# Patient Record
Sex: MALE | Race: WHITE | Employment: FULL TIME | URBAN - METROPOLITAN AREA
[De-identification: names, ages, dates, MRNs, and addresses within clinical notes are randomized per-mention and may not be internally consistent; named-entity substitution may affect disease eponyms.]

---

## 2019-12-26 ENCOUNTER — HOSPITAL ENCOUNTER (EMERGENCY)
Age: 44
Discharge: HOME | End: 2019-12-26
Attending: EMERGENCY MEDICINE
Payer: COMMERCIAL

## 2019-12-26 ENCOUNTER — APPOINTMENT (OUTPATIENT)
Dept: GENERAL RADIOLOGY | Age: 44
End: 2019-12-26
Attending: NURSE PRACTITIONER
Payer: COMMERCIAL

## 2019-12-26 VITALS
TEMPERATURE: 98.3 F | DIASTOLIC BLOOD PRESSURE: 82 MMHG | RESPIRATION RATE: 11 BRPM | BODY MASS INDEX: 26.51 KG/M2 | OXYGEN SATURATION: 97 % | WEIGHT: 200 LBS | HEART RATE: 70 BPM | SYSTOLIC BLOOD PRESSURE: 151 MMHG | HEIGHT: 73 IN

## 2019-12-26 DIAGNOSIS — S62.637B OPEN DISPLACED FRACTURE OF DISTAL PHALANX OF LEFT LITTLE FINGER, INITIAL ENCOUNTER: Primary | ICD-10-CM

## 2019-12-26 LAB
GLUCOSE BLD STRIP.AUTO-MCNC: 107 MG/DL (ref 65–100)
SERVICE CMNT-IMP: ABNORMAL

## 2019-12-26 PROCEDURE — 96365 THER/PROPH/DIAG IV INF INIT: CPT

## 2019-12-26 PROCEDURE — 74011000258 HC RX REV CODE- 258: Performed by: EMERGENCY MEDICINE

## 2019-12-26 PROCEDURE — 75810000293 HC SIMP/SUPERF WND  RPR

## 2019-12-26 PROCEDURE — 73140 X-RAY EXAM OF FINGER(S): CPT

## 2019-12-26 PROCEDURE — 74011250636 HC RX REV CODE- 250/636: Performed by: EMERGENCY MEDICINE

## 2019-12-26 PROCEDURE — 82962 GLUCOSE BLOOD TEST: CPT

## 2019-12-26 PROCEDURE — 93005 ELECTROCARDIOGRAM TRACING: CPT

## 2019-12-26 PROCEDURE — 99285 EMERGENCY DEPT VISIT HI MDM: CPT

## 2019-12-26 PROCEDURE — 74011000250 HC RX REV CODE- 250: Performed by: NURSE PRACTITIONER

## 2019-12-26 RX ORDER — HYDROCODONE BITARTRATE AND ACETAMINOPHEN 5; 325 MG/1; MG/1
1 TABLET ORAL
Qty: 10 TAB | Refills: 0 | Status: SHIPPED | OUTPATIENT
Start: 2019-12-26 | End: 2019-12-29

## 2019-12-26 RX ORDER — AMOXICILLIN AND CLAVULANATE POTASSIUM 875; 125 MG/1; MG/1
1 TABLET, FILM COATED ORAL 2 TIMES DAILY
Qty: 14 TAB | Refills: 0 | Status: SHIPPED | OUTPATIENT
Start: 2019-12-26 | End: 2020-01-02

## 2019-12-26 RX ORDER — LIDOCAINE HYDROCHLORIDE 10 MG/ML
5 INJECTION, SOLUTION EPIDURAL; INFILTRATION; INTRACAUDAL; PERINEURAL ONCE
Status: COMPLETED | OUTPATIENT
Start: 2019-12-26 | End: 2019-12-26

## 2019-12-26 RX ADMIN — LIDOCAINE HYDROCHLORIDE 5 ML: 10 INJECTION INFILTRATION at 15:35

## 2019-12-26 RX ADMIN — AMPICILLIN SODIUM AND SULBACTAM SODIUM 3 G: 2; 1 INJECTION, POWDER, FOR SOLUTION INTRAMUSCULAR; INTRAVENOUS at 17:57

## 2019-12-26 NOTE — ED NOTES
History of Present Illness     Patient Identification  Gary Baron is a 38 y.o. male. Patient information was obtained from patient. History/Exam limitations: none. Patient presented to the Emergency Department by private vehicle. Chief Complaint   Finger Pain and Laceration      Patient is here for evaluation after a fall. Patient reportedly was sitting on edge of bed and slid off when he fell from sitting. The accident occurred 1600  1 hour ago ago. It is reported that the patient did not have LOC. At this time he complains of no injuries. Patient denies no injuries. Symptoms are exacerbated by Nothing. Pre-hospital information: none available. The patient has tried none needed for his symptoms with not here for episode had laceration relief. No past medical history on file. No family history on file.   Current Facility-Administered Medications   Medication Dose Route Frequency Provider Last Rate Last Dose    lidocaine (PF) (XYLOCAINE) 10 mg/mL (1 %) injection 5 mL  5 mL IntraDERMal ONCE Leah Crawford, SARAVANAN        ampicillin-sulbactam (UNASYN) 3 g in 0.9% sodium chloride (MBP/ADV) 100 mL  3 g IntraVENous Young Patel MD         No Known Allergies  Social History     Socioeconomic History    Marital status:      Spouse name: Not on file    Number of children: Not on file    Years of education: Not on file    Highest education level: Not on file   Occupational History    Not on file   Social Needs    Financial resource strain: Not on file    Food insecurity:     Worry: Not on file     Inability: Not on file    Transportation needs:     Medical: Not on file     Non-medical: Not on file   Tobacco Use    Smoking status: Not on file   Substance and Sexual Activity    Alcohol use: Not on file    Drug use: Not on file    Sexual activity: Not on file   Lifestyle    Physical activity:     Days per week: Not on file     Minutes per session: Not on file    Stress: Not on file   Relationships    Social connections:     Talks on phone: Not on file     Gets together: Not on file     Attends Worship service: Not on file     Active member of club or organization: Not on file     Attends meetings of clubs or organizations: Not on file     Relationship status: Not on file    Intimate partner violence:     Fear of current or ex partner: Not on file     Emotionally abused: Not on file     Physically abused: Not on file     Forced sexual activity: Not on file   Other Topics Concern    Not on file   Social History Narrative    Not on file     Review of Systems  A comprehensive review of systems was negative except for that written in the HPI. Physical Exam     Visit Vitals  BP (!) 116/94   Pulse (!) 58   Temp 98.3 °F (36.8 °C)   Resp 14   Ht 6' 1\" (1.854 m)   Wt 90.7 kg (200 lb)   SpO2 97%   BMI 26.39 kg/m²     Burwell Coma Score  Eye openin - Opens eyes on own   Verbal:  5 - Alert and oriented   Motor:  6 - Follows simple motor commands   GCS Total: 15     Visit Vitals  BP (!) 116/94   Pulse (!) 58   Temp 98.3 °F (36.8 °C)   Resp 14   Ht 6' 1\" (1.854 m)   Wt 90.7 kg (200 lb)   SpO2 97%   BMI 26.39 kg/m²     General:  Alert, cooperative, no distress, appears stated age. Head:  Normocephalic, without obvious abnormality, atraumatic. Eyes:  Conjunctivae/corneas clear. PERRL, EOMs intact. Fundi benign   Ears:  Normal TMs and external ear canals both ears. Nose: Nares normal. Septum midline. Mucosa normal. No drainage or sinus tenderness. Throat: Lips, mucosa, and tongue normal. Teeth and gums normal.   Neck: Supple, symmetrical, trachea midline, no adenopathy, thyroid: no enlargement/tenderness/nodules, no carotid bruit and no JVD. Back:   Symmetric, no curvature. ROM normal. No CVA tenderness. Lungs:   Clear to auscultation bilaterally. Chest wall:  No tenderness or deformity. Heart:  Regular rate and rhythm, S1, S2 normal, no murmur, click, rub or gallop. Abdomen:   Soft, non-tender. Bowel sounds normal. No masses,  No organomegaly. Genitalia:  Normal male without lesion, discharge or tenderness. Rectal:  Normal tone, normal prostate, no masses or tenderness  Guaiac negative stool. Extremities: Extremities normal, atraumatic, no cyanosis or edema. Pulses: 2+ and symmetric all extremities. Skin: Skin color, texture, turgor normal. No rashes or lesions   Lymph nodes: Cervical, supraclavicular, and axillary nodes normal.   Neurologic: CNII-XII intact. Normal strength, sensation and reflexes throughout. ED Course     Studies: EKG    Records Reviewed: Previous electrocardiograms. Treatments: None.     Consultations: none at this time    Disposition: Home Advised to return for worsening or additional problems such as abdominal or chest pain

## 2019-12-26 NOTE — ED TRIAGE NOTES
Pt arrives with the c/c of hitting 5th digit with a sledge hammer, laceration to finger, open injury. Pt unsure of tetanus vaccine.

## 2019-12-26 NOTE — ED PROVIDER NOTES
38 y.o. male with no significant past medical history who presents from private vehicle with chief complaint of laceration. Pt reports hitting his left fifth finger with a sledgehammer approximately 1 hour ago. Pt with a laceration to the left fifth finger. Pt is right hand dominant. Pt had a syncopal episode in the ED when he looked at his finger; he was assisted to the ground. Pt states he has a history of passing out in the presence of \"doctors or needles\". NKDA. Pt denies any other injury. There are no other acute medical concerns at this time. Note written by Yamini Moser, as dictated by Blue Lawson MD 3:45 PM        The history is provided by the patient and a relative. No  was used. No past medical history on file. No past surgical history on file. No family history on file.     Social History     Socioeconomic History    Marital status: Not on file     Spouse name: Not on file    Number of children: Not on file    Years of education: Not on file    Highest education level: Not on file   Occupational History    Not on file   Social Needs    Financial resource strain: Not on file    Food insecurity:     Worry: Not on file     Inability: Not on file    Transportation needs:     Medical: Not on file     Non-medical: Not on file   Tobacco Use    Smoking status: Not on file   Substance and Sexual Activity    Alcohol use: Not on file    Drug use: Not on file    Sexual activity: Not on file   Lifestyle    Physical activity:     Days per week: Not on file     Minutes per session: Not on file    Stress: Not on file   Relationships    Social connections:     Talks on phone: Not on file     Gets together: Not on file     Attends Christian service: Not on file     Active member of club or organization: Not on file     Attends meetings of clubs or organizations: Not on file     Relationship status: Not on file    Intimate partner violence:     Fear of current or ex partner: Not on file     Emotionally abused: Not on file     Physically abused: Not on file     Forced sexual activity: Not on file   Other Topics Concern    Not on file   Social History Narrative    Not on file         ALLERGIES: Patient has no known allergies. Review of Systems   Constitutional: Negative for chills and fever. HENT: Negative for ear pain and sore throat. Eyes: Negative for pain. Respiratory: Negative for chest tightness and shortness of breath. Cardiovascular: Negative for chest pain. Gastrointestinal: Negative for abdominal pain. Genitourinary: Negative for flank pain. Musculoskeletal: Negative for back pain. Skin: Positive for wound. Negative for rash. Neurological: Positive for syncope. Negative for headaches. All other systems reviewed and are negative. Vitals:    12/26/19 1533   BP: 129/88   Pulse: 64   Resp: 18   Temp: 98.3 °F (36.8 °C)   SpO2: 99%   Weight: 90.7 kg (200 lb)   Height: 6' 1\" (1.854 m)            Physical Exam  Vitals signs and nursing note reviewed. Constitutional:       General: He is not in acute distress. HENT:      Head: Normocephalic and atraumatic. Eyes:      General: No scleral icterus. Conjunctiva/sclera: Conjunctivae normal.      Pupils: Pupils are equal, round, and reactive to light. Neck:      Musculoskeletal: No neck rigidity. Trachea: No tracheal deviation. Cardiovascular:      Rate and Rhythm: Normal rate and regular rhythm. Pulmonary:      Effort: Pulmonary effort is normal. No respiratory distress. Breath sounds: Normal breath sounds. No stridor. Abdominal:      General: There is no distension. Palpations: Abdomen is soft. Tenderness: There is no tenderness. Genitourinary:     Comments: deferred  Musculoskeletal:         General: No deformity. Left hand: He exhibits laceration. Comments: Transverse laceration of the left 5th digit overlying the distal phalanx. Skin:     General: Skin is warm and dry. Neurological:      General: No focal deficit present. Mental Status: He is alert. Psychiatric:         Mood and Affect: Mood normal.         Behavior: Behavior normal.     Note written by Yamini Klein, as dictated by Emmanuel Forman MD 3:45 PM       MDM  Number of Diagnoses or Management Options  Open displaced fracture of distal phalanx of left little finger, initial encounter:   Diagnosis management comments: 72-year-old right-hand-dominant male with left finger injury after smashing with a sledgehammer. Soaked in Betadine. Irrigated with copious saline. Closed loosely. Given Unasyn IV. Discharged on Augmentin. Pain control. Follow-up closely with Ortho hand.          Wound Repair  Date/Time: 12/26/2019 7:33 PM  Performed by: attendingPreparation: skin prepped with alcohol and skin prepped with Betadine  Location details: left small finger  Wound length:2.6 - 7.5 cm  Anesthesia: digital block    Anesthesia:  Local Anesthetic: lidocaine 1% without epinephrine  Foreign bodies: no foreign bodies  Irrigation solution: saline  Irrigation method: jet lavage  Skin closure: 4-0 nylon  Approximation: loose  Dressing: non-adhesive packing strip and tube gauze  Patient tolerance: Patient tolerated the procedure well with no immediate complications

## 2019-12-26 NOTE — ED NOTES
Patient had a syncopal episode and assisted to floor by Dr. Galen Tee and radiology tech, leatha martinez called. Patient assisted back to bed. Dr. Galen Tee still at bedside    Patient bradycardic with HR decreasing to 32. Dr. Mak Leong made aware. EKG done and peripheral IV access obtained. Patient HR increased to WNL     XRAY at bedside.

## 2019-12-26 NOTE — DISCHARGE INSTRUCTIONS
Patient Education        Finger Fracture: Care Instructions  Your Care Instructions    Breaks in the bones of the finger usually heal well in about 3 to 4 weeks. The pain and swelling from a broken finger can last for weeks. But it should steadily improve, starting a few days after you break it. It is very important that you wear and take care of the cast or splint exactly as your doctor tells you to so that your finger heals properly and does not end up crooked. Wearing a splint may interfere with your normal activities. Ask for help with daily tasks if you need it. You heal best when you take good care of yourself. Eat a variety of healthy foods, and don't smoke. Follow-up care is a key part of your treatment and safety. Be sure to make and go to all appointments, and call your doctor if you are having problems. It's also a good idea to know your test results and keep a list of the medicines you take. How can you care for yourself at home? · If your doctor put a splint on your finger, wear the splint exactly as directed. Do not remove it until your doctor says that you can. · Keep your hand raised above the level of your heart as much as you can. This will help reduce swelling. · Put ice or a cold pack on your finger for 10 to 20 minutes at a time. Try to do this every 1 to 2 hours for the next 3 days (when you are awake) or until the swelling goes down. Put a thin cloth between the ice and your skin. Keep the splint dry. · Be safe with medicines. Take pain medicines exactly as directed. ? If the doctor gave you a prescription medicine for pain, take it as prescribed. ? If you are not taking a prescription pain medicine, ask your doctor if you can take an over-the-counter medicine. When should you call for help? Call 911 anytime you think you may need emergency care.  For example, call if:    · Your finger is cool or pale or changes color.    Call your doctor now or seek immediate medical care if:    · Your pain gets much worse.     · You have tingling, weakness, or numbness in your finger.     · You have signs of infection, such as:  ? Increased pain, swelling, warmth, or redness. ? Red streaks leading from the area. ? Pus draining from the area. ? Swollen lymph nodes in your neck, armpits, or groin. ? A fever.    Watch closely for changes in your health, and be sure to contact your doctor if:    · Your finger is not steadily improving. Where can you learn more? Go to http://glynn-vic.info/. Enter T378 in the search box to learn more about \"Finger Fracture: Care Instructions. \"  Current as of: June 26, 2019  Content Version: 12.2  © 9745-6448 [employer]. Care instructions adapted under license by [employer] (which disclaims liability or warranty for this information). If you have questions about a medical condition or this instruction, always ask your healthcare professional. [place] [address] any warranty or liability for your use of this information.

## 2019-12-26 NOTE — ED NOTES
3:36 PM  I have evaluated the patient as the Provider in Triage. I have reviewed His vital signs and the triage nurse assessment. I have talked with the patient and any available family and advised that I am the provider in triage and have ordered the appropriate study to initiate their work up based on the clinical presentation during my assessment. I have advised that the patient will be accommodated in the Main ED as soon as possible. I have also requested to contact the triage nurse or myself immediately if the patient experiences any changes in their condition during this brief waiting period. Hip left fifth finger with a sledgehammer approximately 30 minutes prior to arrival.  Patient with laceration to the left fifth finger. Last tetanus shot approximately 1 year ago.   Dennis Wadsworth NP

## 2019-12-27 LAB
ATRIAL RATE: 45 BPM
CALCULATED P AXIS, ECG09: 58 DEGREES
CALCULATED R AXIS, ECG10: 17 DEGREES
CALCULATED T AXIS, ECG11: 39 DEGREES
DIAGNOSIS, 93000: NORMAL
P-R INTERVAL, ECG05: 150 MS
Q-T INTERVAL, ECG07: 438 MS
QRS DURATION, ECG06: 98 MS
QTC CALCULATION (BEZET), ECG08: 378 MS
VENTRICULAR RATE, ECG03: 45 BPM